# Patient Record
Sex: FEMALE | Race: WHITE | NOT HISPANIC OR LATINO | Employment: OTHER | URBAN - METROPOLITAN AREA
[De-identification: names, ages, dates, MRNs, and addresses within clinical notes are randomized per-mention and may not be internally consistent; named-entity substitution may affect disease eponyms.]

---

## 2019-10-07 ENCOUNTER — OFFICE VISIT (OUTPATIENT)
Dept: NEUROSURGERY | Facility: CLINIC | Age: 69
End: 2019-10-07

## 2019-10-07 VITALS
TEMPERATURE: 97.6 F | HEIGHT: 62 IN | SYSTOLIC BLOOD PRESSURE: 130 MMHG | WEIGHT: 140 LBS | BODY MASS INDEX: 25.76 KG/M2 | DIASTOLIC BLOOD PRESSURE: 82 MMHG

## 2019-10-07 DIAGNOSIS — M48.56XA PATHOLOGICAL COMPRESSION FRACTURE OF LUMBAR VERTEBRA, INITIAL ENCOUNTER (HCC): Primary | ICD-10-CM

## 2019-10-07 DIAGNOSIS — M51.36 DDD (DEGENERATIVE DISC DISEASE), LUMBAR: ICD-10-CM

## 2019-10-07 PROCEDURE — 99203 OFFICE O/P NEW LOW 30 MIN: CPT | Performed by: NEUROLOGICAL SURGERY

## 2019-10-07 RX ORDER — PREGABALIN 100 MG/1
100 CAPSULE ORAL 2 TIMES DAILY
COMMUNITY

## 2019-10-07 RX ORDER — CALCITONIN SALMON 200 [IU]/.09ML
SPRAY, METERED NASAL
COMMUNITY
Start: 2019-10-04 | End: 2019-10-10

## 2019-10-07 RX ORDER — FLUOXETINE HYDROCHLORIDE 40 MG/1
40 CAPSULE ORAL DAILY
Refills: 1 | COMMUNITY
Start: 2019-10-02

## 2019-10-07 RX ORDER — NORTRIPTYLINE HYDROCHLORIDE 25 MG/1
25 CAPSULE ORAL DAILY
Refills: 0 | COMMUNITY
Start: 2019-10-02

## 2019-10-07 RX ORDER — HYDROCODONE BITARTRATE AND ACETAMINOPHEN 5; 325 MG/1; MG/1
TABLET ORAL
Refills: 0 | COMMUNITY
Start: 2019-09-24 | End: 2019-10-10 | Stop reason: SDUPTHER

## 2019-10-07 NOTE — PROGRESS NOTES
NAME: JORGE SEN   DOS: 10/7/2019  : 1950  PCP: Provider, No Known    Chief Complaint:    Chief Complaint   Patient presents with   • Back Pain       History of Present Illness:  68 y.o. female   This is a 68-year-old female with a complex history of back pain she has a history of a prior a lift at L5-S1 that did somewhat help but she is gone on to get treatments for her back ever since.  She is had multiple RFA's of her back she has a history of osteoporosis and most recently she has been moving from the [place] area was moving some sheets across the bed and felt a pop in her back this was associate with significant pain this was read reduced actually in the shower once and she decided to diallo the attention of interventional pain specialist who ordered an MRI.  She sent for referral for L2 and L3 superior endplate fractures she denies any cauda equina syndrome she is had significant reduction in her pain but is still on opioids  PMHX  Allergies:  Allergies   Allergen Reactions   • Penicillins Swelling   • Sulfa Antibiotics Swelling     Medications    Current Outpatient Medications:   •  calcitonin, salmon, (MIACALCIN) 200 UNIT/ACT nasal spray, , Disp: , Rfl:   •  FLUoxetine (PROzac) 40 MG capsule, Take 40 mg by mouth Daily., Disp: , Rfl: 1  •  HYDROcodone-acetaminophen (NORCO) 5-325 MG per tablet, TAKE 1 TABLET BY MOUTH EVERY 4 TO 6 HOURS AS NEEDED, Disp: , Rfl: 0  •  nortriptyline (PAMELOR) 25 MG capsule, Take 25 mg by mouth Daily., Disp: , Rfl: 0  •  pregabalin (LYRICA) 100 MG capsule, Take 100 mg by mouth 2 (Two) Times a Day., Disp: , Rfl:   Past Medical History:  Past Medical History:   Diagnosis Date   • Anemia    • Arthritis    • Osteoporosis      Past Surgical History:  Past Surgical History:   Procedure Laterality Date   • KNEE SURGERY Right     x3   • LUMBAR FUSION      L5-S1- [place]     Social Hx:  Social History     Tobacco Use   • Smoking status: Never Smoker   •  Smokeless tobacco: Never Used   Substance Use Topics   • Alcohol use: No     Frequency: Never   • Drug use: No     Family Hx:  Family History   Problem Relation Age of Onset   • Heart failure Mother    • Emphysema Father      Review of Systems:        Review of Systems   Constitutional: Negative for activity change, appetite change, chills, diaphoresis, fatigue, fever and unexpected weight change.   HENT: Negative for congestion, dental problem, drooling, ear discharge, ear pain, facial swelling, hearing loss, mouth sores, nosebleeds, postnasal drip, rhinorrhea, sinus pressure, sneezing, sore throat, tinnitus, trouble swallowing and voice change.    Eyes: Negative for photophobia, pain, discharge, redness, itching and visual disturbance.   Respiratory: Negative for apnea, cough, choking, chest tightness, shortness of breath, wheezing and stridor.    Cardiovascular: Negative for chest pain, palpitations and leg swelling.   Gastrointestinal: Negative for abdominal distention, abdominal pain, anal bleeding, blood in stool, constipation, diarrhea, nausea, rectal pain and vomiting.   Endocrine: Negative for cold intolerance, heat intolerance, polydipsia, polyphagia and polyuria.   Genitourinary: Negative for decreased urine volume, difficulty urinating, dysuria, enuresis, flank pain, frequency, genital sores, hematuria and urgency.   Musculoskeletal: Positive for back pain, gait problem and myalgias. Negative for arthralgias, joint swelling, neck pain and neck stiffness.   Skin: Negative for color change, pallor, rash and wound.   Allergic/Immunologic: Negative for environmental allergies, food allergies and immunocompromised state.   Neurological: Negative for dizziness, tremors, seizures, syncope, facial asymmetry, speech difficulty, weakness, light-headedness, numbness and headaches.   Hematological: Negative for adenopathy. Does not bruise/bleed easily.   Psychiatric/Behavioral: Negative for agitation, behavioral  problems, confusion, decreased concentration, dysphoric mood, hallucinations, self-injury, sleep disturbance and suicidal ideas. The patient is not nervous/anxious and is not hyperactive.           I have reviewed this note template and all pertinent parts of the review of systems social, family history, surgical history and medication list  Physical Examination:  Vitals:    10/07/19 0904   BP: 130/82   Temp: 97.6 °F (36.4 °C)      General Appearance:   Well developed, well nourished, well groomed, alert, and cooperative.  Neurological examination:  Neurologic Exam  Vital signs were reviewed and documented in the chart  Patient appeared in good neurologic function with normal comprehension fluent speech  Mood and affect are normal  Sense of smell deferred  She appears in no apparent distress  Pupils symmetric equally reactive funduscopic exam not visualized   Visual fields intact to confrontation  Extraocular movements intact  Face motor function is symmetric  Facial sensations normal  Hearing intact to finger rub hearing intact to finger rub  Tongue is midline  Palate symmetric  Swallowing normal  Shoulder shrug normal  She is somewhat tender in her lumbar spine at the region of the L2-3 area  Muscle bulk and tone normal  5 out of 5 strength no motor drift  Gait normal intact  Negative Romberg  No clonus long tract signs or myelopathy    Reflexes symmetric  No edema noted and extremities skin appears normal    Straight leg raise sign absent  No signs of intrinsic hip dysfunction  Back is without any lesions or abnormality  Feet are warm and well perfused        Review of Imaging/DATA:  I reviewed her MRI she has superior endplate fractures L2 and L3 x-ray reports that are reviewed demonstrated similar findings   diagnoses/Plan:    Ms. Patino is a 68 y.o. female   She presents with a history of complex lumbar pain fibromyalgia and a history of recent trauma with minimal manipulation she has 2 small endplate  fractures she is wearing a brace and her pain is under control she is able to sit upright comfortable in the office ambulate and she has reported improvement in the last 2 weeks I gave her the option for kyphoplasty and explained the risk benefits and expected outcome from it but encouraged her to continue with conservative course of management in my experience kyphoplasty in the situation may assist with some early upfront pain relief but the types of symptoms right now that she has which is mostly a discomfort type of pain and she is able to get up and move around usually persists additionally there is a trade off between the rigidity of the cement and the weakness of the bones down the road.    I explained the signs and symptoms to look for to necessitate a referral back to be a pleasure to see her anytime

## 2019-10-10 ENCOUNTER — OFFICE VISIT (OUTPATIENT)
Dept: INTERNAL MEDICINE | Facility: CLINIC | Age: 69
End: 2019-10-10

## 2019-10-10 VITALS
OXYGEN SATURATION: 96 % | SYSTOLIC BLOOD PRESSURE: 122 MMHG | TEMPERATURE: 97.6 F | RESPIRATION RATE: 16 BRPM | DIASTOLIC BLOOD PRESSURE: 80 MMHG | HEART RATE: 82 BPM | BODY MASS INDEX: 26.65 KG/M2 | WEIGHT: 144.8 LBS | HEIGHT: 62 IN

## 2019-10-10 DIAGNOSIS — J43.2 CENTRILOBULAR EMPHYSEMA (HCC): ICD-10-CM

## 2019-10-10 DIAGNOSIS — M48.56XD PATHOLOGICAL COMPRESSION FRACTURE OF LUMBAR VERTEBRA WITH ROUTINE HEALING, SUBSEQUENT ENCOUNTER: Primary | ICD-10-CM

## 2019-10-10 DIAGNOSIS — M51.36 DEGENERATIVE DISC DISEASE, LUMBAR: ICD-10-CM

## 2019-10-10 DIAGNOSIS — M84.48XD PATHOLOGICAL FRACTURE OF LUMBAR VERTEBRA WITH ROUTINE HEALING, SUBSEQUENT ENCOUNTER: Primary | ICD-10-CM

## 2019-10-10 DIAGNOSIS — M79.7 FIBROMYALGIA: ICD-10-CM

## 2019-10-10 DIAGNOSIS — M80.00XA OSTEOPOROSIS WITH CURRENT PATHOLOGICAL FRACTURE, UNSPECIFIED OSTEOPOROSIS TYPE, INITIAL ENCOUNTER: ICD-10-CM

## 2019-10-10 PROCEDURE — 99204 OFFICE O/P NEW MOD 45 MIN: CPT | Performed by: PHYSICIAN ASSISTANT

## 2019-10-10 RX ORDER — HYDROCODONE BITARTRATE AND ACETAMINOPHEN 5; 325 MG/1; MG/1
1 TABLET ORAL EVERY 6 HOURS PRN
Qty: 40 TABLET | Refills: 0 | Status: SHIPPED | OUTPATIENT
Start: 2019-10-10 | End: 2019-10-21 | Stop reason: SDUPTHER

## 2019-10-10 RX ORDER — ACETAMINOPHEN,DIPHENHYDRAMINE HCL 500; 25 MG/1; MG/1
2 TABLET, FILM COATED ORAL
COMMUNITY

## 2019-10-10 RX ORDER — IBANDRONATE SODIUM 150 MG/1
150 TABLET, FILM COATED ORAL
Qty: 3 TABLET | Refills: 0 | Status: SHIPPED | OUTPATIENT
Start: 2019-10-10 | End: 2019-10-11 | Stop reason: SDUPTHER

## 2019-10-10 NOTE — PROGRESS NOTES
Chief Complaint   Patient presents with   • Establish Care     New PCP, move from [place]   • Back Injury     08/14/2019, Review medication, Dr. Schmitz       Subjective       History of Present Illness     Cait Patino is a 68 y.o. female. She presents as a new patient to establish [facility]. She recently moved to [place] from [place]. Primary concerns today include f/u of vertebral fracture with osteoporisis, and fibromyalgia. Pt reports that she sustained a back injury on 8/14/2019 as a result of lifting and was found to have a vertebral endplate fx of L2 and L3, per MRI in [place]. She was seen by Dr. Schmitz in neurosurgery on 10/7/2019. They discussed conservative tx vs kyphoplasty, and pt has decided to pursue conservative tx at this time with Norco and wearing a back brace. She was very pleased with the visit with Dr. Schmitz and states she will f/u with him if needed in the future or if she decides to pursue a kyphoplasty. She is taking Norco TID and this relieves most of her back pain. She still has some difficulty with standing for long periods of time, but her pain is slowly improving. She has also seen Dr. Smith at  pain management for this issue, and will f/u PRN at .     PMHx:  - Current fibromylagia on Lyrica BID, Prozac qday, and nortriptyline daily which control her symptoms well  - Seasonal allergies and takes OTC allergy meds PRN, currently under good control  - Hx iron deficiency anemia, not currently on iron supplement  - Insomnia, and takes melatonin nightly and occasionally tylenol PM which allows her to sleep 8 hours/ night restful   - Hiatal hernia with Leo's lesion, not currently on PPI, no current symptoms  - Osteoporosis for which she has tried Prolia in the past q6months for a total of 5 injections and d/c 2 years ago after developing bone pain and thigh pain, and these side effects resolved after 4-5 months after d/c Prolia  - Emphysema per previous MD records which I  have reviewed, with CT chest showing centrilobular emphysema. Previously on Advair and Incruse, but not currently on any therapies and she is asymptomatic at this time. PFTs by previous MD on 12/4/2018 per records, scanned in EPIC  -  knee surgery x3 in 1980s  - Fusion of lumbar spine in 1998      Are you currently seeing any other doctors or specialists? Dr. Schmitz-- neurosurgery; Dr. Smith- [facility]   Are you currently taking any OTC medications or herbal medications? As noted below in med list, as well as daily multivitamin, Tylenol PM at night for sleep     Sleep: 8 hours/ night   Diet: very healthy, per pt  Exercise: 2 miles/ day on treadmill walking (prior to fx)    Most recent colonoscopy: 10/12/2018, records reviewed, scanned into [facility] with medical record history  First day of last menses: n/a, post-menopausal   DEXA scan: 12/3/2015, records reviewed, scanned into [facility]     Regular dental visits: Yes   Regular eye exams: Yes, wears glasses       The following portions of the patient's history were reviewed and updated as appropriate: allergies, current medications, past family history, past medical history, past social history, past surgical history and problem list.    Allergies   Allergen Reactions   • Penicillins Swelling   • Sulfa Antibiotics Swelling     Social History     Tobacco Use   • Smoking status: Never Smoker   • Smokeless tobacco: Never Used   Substance Use Topics   • Alcohol use: No     Frequency: Never     Past Surgical History:   Procedure Laterality Date   • KNEE SURGERY Right     x3   • LUMBAR FUSION  1998    L5-S1- [place]     Family History   Problem Relation Age of Onset   • Heart failure Mother    • Emphysema Father          Current Outpatient Medications:   •  Cholecalciferol (VITAMIN D) 2000 units tablet, Take 2,000 Units by mouth Daily., Disp: , Rfl:   •  diphenhydrAMINE-acetaminophen (TYLENOL PM)  MG tablet per tablet, Take 2 tablets by mouth., Disp: , Rfl:    •  FLUoxetine (PROzac) 40 MG capsule, Take 40 mg by mouth Daily., Disp: , Rfl: 1  •  melatonin 1 MG tablet, Take 3 mg by mouth Every Night., Disp: , Rfl:   •  nortriptyline (PAMELOR) 25 MG capsule, Take 25 mg by mouth Daily., Disp: , Rfl: 0  •  pregabalin (LYRICA) 100 MG capsule, Take 100 mg by mouth 2 (Two) Times a Day., Disp: , Rfl:   •  HYDROcodone-acetaminophen (NORCO) 5-325 MG per tablet, Take 1 tablet by mouth Every 6 (Six) Hours As Needed for Moderate Pain  or Severe Pain ., Disp: 40 tablet, Rfl: 0  •  ibandronate (BONIVA) 150 MG tablet, Take 1 tablet by mouth Every 30 (Thirty) Days. Begin November 1, 2019., Disp: 3 tablet, Rfl: 3    Patient Active Problem List   Diagnosis   • Age-related osteoporosis with current pathological fracture   • Centrilobular emphysema (CMS/HCC)   • Fibromyalgia   • Seasonal allergies   • Primary insomnia       Review of Systems   Constitutional: Negative for chills, fatigue and fever.   HENT: Negative for congestion, ear pain, sneezing, sore throat and trouble swallowing.    Eyes: Negative for pain and visual disturbance.   Respiratory: Negative for cough, shortness of breath and wheezing.    Cardiovascular: Negative for chest pain, palpitations and leg swelling.   Gastrointestinal: Negative for abdominal pain, nausea and vomiting.   Genitourinary: Negative for dysuria and hematuria.   Musculoskeletal: Positive for arthralgias, back pain and myalgias. Negative for gait problem and neck pain.   Skin: Negative for rash.   Allergic/Immunologic: Positive for environmental allergies.   Neurological: Negative for dizziness, weakness and headache.   Psychiatric/Behavioral: The patient is not nervous/anxious.        Objective   Vitals:    10/10/19 0853   BP: 122/80   Pulse: 82   Resp: 16   Temp: 97.6 °F (36.4 °C)   SpO2: 96%     Physical Exam   Constitutional: She appears well-developed and well-nourished.   HENT:   Head: Normocephalic and atraumatic.   Right Ear: Tympanic membrane,  external ear and ear canal normal.   Left Ear: Tympanic membrane, external ear and ear canal normal.   Mouth/Throat: Oropharynx is clear and moist and mucous membranes are normal.   Eyes: Conjunctivae are normal.   Neck: Normal range of motion. Neck supple. Carotid bruit is not present. No thyromegaly present.   Cardiovascular: Normal rate and regular rhythm.   No murmur heard.  Pulmonary/Chest: Effort normal and breath sounds normal. She has no wheezes. She has no rales.   Abdominal: Soft. Bowel sounds are normal. She exhibits no mass. There is no hepatosplenomegaly. There is no tenderness.   Musculoskeletal:        Thoracic back: She exhibits no tenderness and no bony tenderness.        Lumbar back: She exhibits tenderness. She exhibits no bony tenderness.   +minimal TTP overlying L2-L3 spinous processes   Lymphadenopathy:     She has no cervical adenopathy.   Skin: No rash noted.   Psychiatric: She has a normal mood and affect. Her behavior is normal.         No results found for this or any previous visit.    Assessment/Plan   Cait was seen today for establish care and back injury.    Diagnoses and all orders for this visit:    Pathological fracture of lumbar vertebra with routine healing, subsequent encounter  -     Ambulatory Referral to Pain Management    Degenerative disc disease, lumbar  -     Ambulatory Referral to Pain Management    Osteoporosis with current pathological fracture, unspecified osteoporosis type, initial encounter  -     Ambulatory Referral to Pain Management  -     ibandronate (BONIVA) 150 MG tablet; Take 1 tablet by mouth Every 30 (Thirty) Days. Begin November 1, 2019.    Centrilobular emphysema (CMS/HCC)    Fibromyalgia  - Continue Lyrica, Prozac, nortriptyline as directed.       Discussed Boniva. Advised to begin 11/1/2019.   Hydrocodone sent to pharmacy. Advised to f/u with pain management for further refills. She would like to see pain management within [facility] rather than return  to  although she did have a good experience there, but prefers to keep providers within [facility] [place]. Will send to Dr. Sellers.  Lyrica-- pt to call when due for refill. HAO and LAILA updated today.   Follow up with Dr. Nadir HYATT.   Fasting labs at next visit.            Return in about 2 months (around 12/10/2019) for Follow up- back pain .

## 2019-10-11 DIAGNOSIS — M80.00XA OSTEOPOROSIS WITH CURRENT PATHOLOGICAL FRACTURE, UNSPECIFIED OSTEOPOROSIS TYPE, INITIAL ENCOUNTER: ICD-10-CM

## 2019-10-11 DIAGNOSIS — M48.56XD PATHOLOGICAL COMPRESSION FRACTURE OF LUMBAR VERTEBRA WITH ROUTINE HEALING, SUBSEQUENT ENCOUNTER: ICD-10-CM

## 2019-10-11 RX ORDER — IBANDRONATE SODIUM 150 MG/1
150 TABLET, FILM COATED ORAL
Qty: 3 TABLET | Refills: 3 | Status: SHIPPED | OUTPATIENT
Start: 2019-10-11

## 2019-10-11 RX ORDER — HYDROCODONE BITARTRATE AND ACETAMINOPHEN 5; 325 MG/1; MG/1
1 TABLET ORAL EVERY 6 HOURS PRN
Qty: 40 TABLET | Refills: 0 | OUTPATIENT
Start: 2019-10-11

## 2019-10-21 DIAGNOSIS — M48.56XD PATHOLOGICAL COMPRESSION FRACTURE OF LUMBAR VERTEBRA WITH ROUTINE HEALING, SUBSEQUENT ENCOUNTER: ICD-10-CM

## 2019-10-21 PROBLEM — J43.2 CENTRILOBULAR EMPHYSEMA (HCC): Status: ACTIVE | Noted: 2019-10-21

## 2019-10-21 RX ORDER — UREA 10 %
3 LOTION (ML) TOPICAL NIGHTLY
COMMUNITY

## 2019-10-21 RX ORDER — CHOLECALCIFEROL (VITAMIN D3) 50 MCG
2000 TABLET ORAL DAILY
COMMUNITY

## 2019-10-21 RX ORDER — HYDROCODONE BITARTRATE AND ACETAMINOPHEN 5; 325 MG/1; MG/1
1 TABLET ORAL EVERY 6 HOURS PRN
Qty: 40 TABLET | Refills: 0 | Status: SHIPPED | OUTPATIENT
Start: 2019-10-21 | End: 2019-10-30 | Stop reason: SDUPTHER

## 2019-10-27 PROBLEM — J30.2 SEASONAL ALLERGIES: Status: ACTIVE | Noted: 2019-10-27

## 2019-10-27 PROBLEM — M79.7 FIBROMYALGIA: Status: ACTIVE | Noted: 2019-10-27

## 2019-10-27 PROBLEM — F51.01 PRIMARY INSOMNIA: Status: ACTIVE | Noted: 2019-10-27

## 2019-10-30 DIAGNOSIS — M48.56XD PATHOLOGICAL COMPRESSION FRACTURE OF LUMBAR VERTEBRA WITH ROUTINE HEALING, SUBSEQUENT ENCOUNTER: ICD-10-CM

## 2019-10-31 RX ORDER — HYDROCODONE BITARTRATE AND ACETAMINOPHEN 5; 325 MG/1; MG/1
1 TABLET ORAL EVERY 6 HOURS PRN
Qty: 40 TABLET | Refills: 0 | Status: SHIPPED | OUTPATIENT
Start: 2019-10-31 | End: 2019-11-10 | Stop reason: SDUPTHER

## 2019-11-10 DIAGNOSIS — M48.56XD PATHOLOGICAL COMPRESSION FRACTURE OF LUMBAR VERTEBRA WITH ROUTINE HEALING, SUBSEQUENT ENCOUNTER: ICD-10-CM

## 2019-11-11 RX ORDER — HYDROCODONE BITARTRATE AND ACETAMINOPHEN 5; 325 MG/1; MG/1
1 TABLET ORAL EVERY 6 HOURS PRN
Qty: 40 TABLET | Refills: 0 | Status: SHIPPED | OUTPATIENT
Start: 2019-11-11

## 2019-11-13 ENCOUNTER — TELEPHONE (OUTPATIENT)
Dept: INTERNAL MEDICINE | Facility: CLINIC | Age: 69
End: 2019-11-13

## 2019-11-13 NOTE — TELEPHONE ENCOUNTER
"Cait Carey [phone]  Returned pt's call, advised of clinical message. Pt states she declined the office visit due to her imaging... \" Pt was talking to quickly and disconnected the call, not able to understand what was said due to pt mumbling.\" Returned pt's call after she disconnected to confirm if she wanted our referrals coordinator to contact pain management to see if she is able to be seen sooner. Pt states \" She's already spoken to them yesterday and is in the que to be seen. Pt stated \" That what you're not understanding is she's still waiting to be seen. \" Pt stated she will go to the ED, then disconnected.      "

## 2019-11-13 NOTE — TELEPHONE ENCOUNTER
Pt called stating that she is having bad back and neck pain from her previous fracture that have not yet healed. She has put in a call to Dr Sellers's office to be seen but is in a queue to be scheduled and is having a difficult time doing anything.  She said that the HYDROcodone-acetaminophen (NORCO) 5-325 MG per tablet that where sent to the pharmacy on 11/11 are doing very little to help. She is needing something stronger and advice on what to do.     Please call pt and advise.

## 2019-11-13 NOTE — TELEPHONE ENCOUNTER
Noted. Will await pt call back for any further needs. It is unfortunate that she declined the initial evaluation by Dr. Sellers, but this is documented in the referrals to pain management that she would call back in the future if she was interested.

## 2019-11-13 NOTE — TELEPHONE ENCOUNTER
We have sent in her refill of Norco, but otherwise she will need to see pain management or go to the ED. She was referred to Dr. Sellers for this very reason to establish care for long-term management, but declined an appt at that time per referral communications.

## 2019-11-16 DIAGNOSIS — M48.56XD PATHOLOGICAL COMPRESSION FRACTURE OF LUMBAR VERTEBRA WITH ROUTINE HEALING, SUBSEQUENT ENCOUNTER: ICD-10-CM

## 2019-11-18 ENCOUNTER — TELEPHONE (OUTPATIENT)
Dept: INTERNAL MEDICINE | Facility: CLINIC | Age: 69
End: 2019-11-18

## 2019-11-18 NOTE — TELEPHONE ENCOUNTER
We are well aware of her back fracture, hence the reason she was originally referred to pain management to get established. As previously noted, it is quite unfortunate that she declined the original referral to Dr. Sellers and is now finding it difficult to schedule with them- this is what I was trying to prevent in the beginning by referring her to pain management to become an established patient. I have very little else to offer as we have reached out again to pain management, and have sent in pain medication in the last week.     I would recommend the patient follow through on the recommendations and referrals in the future as instructed by her providers, so that we may avoid a similar situation. She may continue to see Dr. Almanza. Closing call.

## 2019-11-18 NOTE — TELEPHONE ENCOUNTER
Regarding pain management referral    per Kinga at Dr Mcdaniel office at [phone]  pt has not been triaged as she originally declined but she has to see Dr Armas-pain psychologist at [facility] for pain eval first-they will send referral to Dr Ribeiro office who normally has availability within a week but may be longer right now with holidays. Then Dr Armas will send note to Dr Mcdaniel office to review. If Dr Sellers accepts pt, it may be December before she is seen    reached pt at [phone]  updated her on all-she stated their office should be a little sharper about how they handle pain pts as she has a back fracture that they should know about. She stated she was able to be seen by Dr Mary GARNICA Thursday 11/14/19.  She feels like she has had to jump through all kinds of hoops to get seen and does not like the way all this has been handled. I asked if she meant Dr Mcdaniel office or ours and she said both. Stated she has sent 2 emails to Samira today- told her Samira would get them-She stated she does not want to go to Dr Mcdaniel office as she does not care for his credentials anyway. She stated that maybe she is out of Vicky jo and that she got 2 calls last week from our office telling her to go to the ER and the 2nd person did not even know the 1st person had called her.  I told her several times I was not clinical and asked her if there was anything else that I could do for her on this referral and she stated no.

## 2019-11-19 ENCOUNTER — TELEPHONE (OUTPATIENT)
Dept: INTERNAL MEDICINE | Facility: CLINIC | Age: 69
End: 2019-11-19

## 2019-11-19 ENCOUNTER — TELEPHONE (OUTPATIENT)
Dept: PAIN MEDICINE | Facility: CLINIC | Age: 69
End: 2019-11-19

## 2019-11-19 RX ORDER — HYDROCODONE BITARTRATE AND ACETAMINOPHEN 5; 325 MG/1; MG/1
1 TABLET ORAL EVERY 6 HOURS PRN
Qty: 40 TABLET | Refills: 0 | OUTPATIENT
Start: 2019-11-19

## 2019-11-19 NOTE — TELEPHONE ENCOUNTER
As per note from referring office. Patient has again declined to see our office. Routing back to ambulatory

## 2019-11-19 NOTE — TELEPHONE ENCOUNTER
Pt has been referred to pain management and declined (Dr. Sellers's office). She has also been seen by Dr. Almanza's office at . Phone message to call pt and let her know this needs to be managed by pain management, as we discussed at our very first visit.

## 2019-11-19 NOTE — TELEPHONE ENCOUNTER
Please call pt. I do not typically write for pain medication long-term, as we discussed during our first office visit and pt was therefore referred to pain management for continued prescription management. I know she has seen Dr. Almanza at . I am unable to continue refilling her Norco and she needs to discuss long-term use of narcotics with pain management.     I am happy to see her in office again and consider repeat MRI of her low back, as this is worsening. We would need to see her in. I can order a lumbar XR in the meantime and insurance may require this prior to MRI; however, it would be beneficial to see her in office in the upcoming weeks for MRI eval.

## 2019-11-19 NOTE — TELEPHONE ENCOUNTER
Samira-  She is requesting this every week... Does she still need.  If so, she needs to be seen by you.  Ollie Corbin MD  8:04 PM  11/18/19

## 2019-11-19 NOTE — TELEPHONE ENCOUNTER
"----- Message from Mando Sellers MD sent at 11/12/2019  9:16 PM EST -----  PT DECLINED REFERRAL. SHE ALSO DECLINED KYPHOPLASTY WITH DR VARGAS. SHE WANTED TO \"HEAL\" PLEASE READ MESSAGE BELOW    ### IF SHE CALLS FOR APPT: ADVISE HER TO SEE DR VARGAS AND SEND TO DR MATOS FOR FULL EVAL. WILL REVIEW AFTER ###    PER CHART: Hi, I am in severe pain. Low back and neck/right shoulder. Not sleeping , Pain does not ease with pain meds. I will come in today for xrays  or whatever you decide. However I need some stronger pain meds, I can not even do normal daily  things.  Please advise ASAP. Pat  "

## 2019-11-21 ENCOUNTER — TRANSCRIBE ORDERS (OUTPATIENT)
Dept: ADMINISTRATIVE | Facility: HOSPITAL | Age: 69
End: 2019-11-21

## 2019-11-21 ENCOUNTER — TELEPHONE (OUTPATIENT)
Dept: NEUROSURGERY | Facility: CLINIC | Age: 69
End: 2019-11-21

## 2019-11-21 DIAGNOSIS — S32.000S LUMBAR COMPRESSION FRACTURE, SEQUELA: Primary | ICD-10-CM

## 2019-11-21 NOTE — TELEPHONE ENCOUNTER
Cait Patino [phone]  [facility] requesting pt call back. Office number given, [phone].

## 2019-11-24 ENCOUNTER — HOSPITAL ENCOUNTER (OUTPATIENT)
Dept: MRI IMAGING | Facility: HOSPITAL | Age: 69
Discharge: HOME | End: 2019-11-24
Admitting: PEDIATRICS

## 2019-11-24 DIAGNOSIS — S32.000S LUMBAR COMPRESSION FRACTURE, SEQUELA: ICD-10-CM

## 2019-11-24 PROCEDURE — 72148 MRI LUMBAR SPINE W/O DYE: CPT

## 2019-12-09 ENCOUNTER — DOCUMENTATION (OUTPATIENT)
Dept: NEUROSURGERY | Facility: CLINIC | Age: 69
End: 2019-12-09

## 2020-01-16 ENCOUNTER — TELEPHONE (OUTPATIENT)
Dept: NEUROSURGERY | Facility: CLINIC | Age: 70
End: 2020-01-16

## 2020-01-16 NOTE — TELEPHONE ENCOUNTER
The patient has not been seen since October.  Can we get an update on her condition?  Does she continue to have pain or has her back pain improved?  Given that she had a traumatic endplate fracture with only minimal movement previously, we would not recommend any hard manipulations to her back or heavy lifting/jerking movements.

## 2020-01-16 NOTE — TELEPHONE ENCOUNTER
Tika called back and I was able to speak with her regarding the pt. For clarification the pt's back pain has resolved and she has requested PT for strengthening and mobility. I relayed Kim's instructions to Tika who verbalized understanding.

## 2020-01-16 NOTE — TELEPHONE ENCOUNTER
Provider: Nadir   Caller: Tika at  internal medicine  Time of call: 1042    Phone #: [phone]    Surgery:  N/A  Surgery Date:  N/A  Last visit: 10/07/19    Next visit: not scheduled          Reason for call:   Internal medicine would get the pt into physical therapy and needs to know if our office has any restrictions for the patient in regards to PT.